# Patient Record
Sex: FEMALE | Race: WHITE | NOT HISPANIC OR LATINO | Employment: UNEMPLOYED | URBAN - METROPOLITAN AREA
[De-identification: names, ages, dates, MRNs, and addresses within clinical notes are randomized per-mention and may not be internally consistent; named-entity substitution may affect disease eponyms.]

---

## 2023-07-19 ENCOUNTER — OFFICE VISIT (OUTPATIENT)
Dept: PRIMARY CARE | Facility: CLINIC | Age: 53
End: 2023-07-19
Payer: COMMERCIAL

## 2023-07-19 VITALS
TEMPERATURE: 98 F | SYSTOLIC BLOOD PRESSURE: 108 MMHG | BODY MASS INDEX: 27.14 KG/M2 | HEART RATE: 77 BPM | HEIGHT: 64 IN | DIASTOLIC BLOOD PRESSURE: 70 MMHG | OXYGEN SATURATION: 97 % | WEIGHT: 159 LBS | RESPIRATION RATE: 18 BRPM

## 2023-07-19 DIAGNOSIS — G40.109 LOCALIZATION-RELATED FOCAL EPILEPSY WITH SIMPLE PARTIAL SEIZURES (MULTI): ICD-10-CM

## 2023-07-19 DIAGNOSIS — F41.1 GENERALIZED ANXIETY DISORDER: Primary | ICD-10-CM

## 2023-07-19 PROCEDURE — 99203 OFFICE O/P NEW LOW 30 MIN: CPT | Performed by: FAMILY MEDICINE

## 2023-07-19 PROCEDURE — 1036F TOBACCO NON-USER: CPT | Performed by: FAMILY MEDICINE

## 2023-07-19 RX ORDER — LAMOTRIGINE 150 MG/1
TABLET ORAL
COMMUNITY
Start: 2018-05-29

## 2023-07-19 RX ORDER — SERTRALINE HYDROCHLORIDE 50 MG/1
50 TABLET, FILM COATED ORAL DAILY
Qty: 30 TABLET | Refills: 5 | Status: SHIPPED | OUTPATIENT
Start: 2023-07-19 | End: 2024-01-12

## 2023-07-19 NOTE — ASSESSMENT & PLAN NOTE
Discussed diagnosis   Discussed different treatment options  Will check some basic labs to see if any secondary causes  Start treatment with Zoloft, Medication and potential side effects discussed at length.  Pt voices understanding.  Continue with counselor  Follow up in 3 months to reevaluate or earlier if any issues

## 2023-07-19 NOTE — PROGRESS NOTES
"Subjective   Patient ID: Bernadette Avalos is a 53 y.o. female who presents for Annual Exam (HAD A PANIC ATTACK DURING SB, SEEN PSYCH, NEURO AND WAS TOLD TO GO TO PCP, HAS EPILEPSY).    HPI   Has history of epilepsy since 2013 and was doing well.  This year on spring break started to have a lot of anxiety and panic attack, had a lot of physical symptoms.  Has had some similar symptoms in past (2021) but rare occurrences.     Feels that usually not an anxious person therefore not sure.  These symptoms have happened when she has been driving and especially out of town.  Does admit to being worried regarding her epilepsy and driving.  She is seeing a therapist currently as well.    Review of Systems  Negative unless noted in HPI    Objective   /70 (BP Location: Left arm)   Pulse 77   Temp 36.7 °C (98 °F) (Oral)   Resp 18   Ht 1.626 m (5' 4.02\")   Wt 72.1 kg (159 lb)   SpO2 97%   BMI 27.28 kg/m²     Physical Exam  Constitutional:       Appearance: She is normal weight.   Eyes:      Extraocular Movements: Extraocular movements intact.      Pupils: Pupils are equal, round, and reactive to light.   Cardiovascular:      Rate and Rhythm: Normal rate and regular rhythm.      Heart sounds: No murmur heard.     No gallop.   Pulmonary:      Effort: Pulmonary effort is normal.      Breath sounds: Normal breath sounds.   Musculoskeletal:      Cervical back: Normal range of motion.   Neurological:      General: No focal deficit present.      Mental Status: She is alert and oriented to person, place, and time.   Psychiatric:         Mood and Affect: Mood normal.         Thought Content: Thought content normal.         Judgment: Judgment normal.         Assessment/Plan   Problem List Items Addressed This Visit       Localization-related focal epilepsy with simple partial seizures (CMS/HCC)     Diagnosed in 2013, no obvious causes  Has been on medication and currently adjusting   Sees neurology at  regularly         " Relevant Orders    Lipid Panel    Basic Metabolic Panel    Generalized anxiety disorder - Primary     Discussed diagnosis   Discussed different treatment options  Will check some basic labs to see if any secondary causes  Start treatment with Zoloft, Medication and potential side effects discussed at length.  Pt voices understanding.  Continue with counselor  Follow up in 3 months to reevaluate or earlier if any issues         Relevant Medications    sertraline (Zoloft) 50 mg tablet    Other Relevant Orders    TSH with reflex to Free T4 if abnormal    CBC    Basic Metabolic Panel

## 2023-08-09 NOTE — ASSESSMENT & PLAN NOTE
Diagnosed in 2013, no obvious causes  Has been on medication and currently adjusting   Sees neurology at  regularly   Sotyktu Counseling:  I discussed the most common side effects of Sotyktu including: common cold, sore throat, sinus infections, cold sores, canker sores, folliculitis, and acne.? I also discussed more serious side effects of Sotyktu including but not limited to: serious allergic reactions; increased risk for infections such as TB; cancers such as lymphomas; rhabdomyolysis and elevated CPK; and elevated triglycerides and liver enzymes.?

## 2023-09-14 ENCOUNTER — NURSE TRIAGE (OUTPATIENT)
Dept: OTHER | Facility: CLINIC | Age: 53
End: 2023-09-14

## 2023-09-14 NOTE — TELEPHONE ENCOUNTER
Location of patient: [place]    Subjective: Caller states \"I missed the last step going down into the basement\"     Current Symptoms: Pain in right foot    Onset: 1 day ago; sudden    Associated Symptoms: irritability , fussiness    Pain Severity: 10/10; sharp; constant    What has been tried: Advil, Frozen peas    Recommended disposition: Go to ED/UCC Now (Or to Office with PCP Approval)    Care advice provided, patient verbalizes understanding; denies any other questions or concerns; instructed to call back for any new or worsening symptoms. Patient/caller agrees to proceed to nearest [facility]     This triage is a result of a call to [address] [place] [address]. Please do not respond to the triage nurse through this encounter. Any subsequent communication should be directly with the patient. Reason for Disposition   Followed a foot injury   Can't stand (bear weight) or walk    Protocols used:  Foot Pain-ADULT-AH, Ankle and Foot Injury-ADULT-AH

## 2023-10-05 PROBLEM — R92.8 ABNORMALITY OF RIGHT BREAST ON SCREENING MAMMOGRAPHY: Status: ACTIVE | Noted: 2023-10-05

## 2023-10-05 PROBLEM — R10.32 ABDOMINAL PAIN, LLQ (LEFT LOWER QUADRANT): Status: ACTIVE | Noted: 2023-10-05

## 2023-10-05 PROBLEM — S93.491A SPRAIN OF ANTERIOR TALOFIBULAR LIGAMENT OF RIGHT ANKLE: Status: ACTIVE | Noted: 2023-10-05

## 2023-10-05 PROBLEM — R53.83 FATIGUE: Status: ACTIVE | Noted: 2023-10-05

## 2023-10-05 PROBLEM — R55 SYNCOPE: Status: ACTIVE | Noted: 2023-10-05

## 2023-10-05 PROBLEM — S92.251A: Status: ACTIVE | Noted: 2023-10-05

## 2023-10-05 PROBLEM — M25.472 LEFT ANKLE SWELLING: Status: ACTIVE | Noted: 2023-10-05

## 2023-10-05 RX ORDER — LAMOTRIGINE 100 MG/1
200 TABLET ORAL 2 TIMES DAILY
COMMUNITY
Start: 2023-05-17

## 2023-10-05 RX ORDER — TRIAMCINOLONE ACETONIDE 1 MG/G
CREAM TOPICAL 2 TIMES DAILY
COMMUNITY
Start: 2019-08-15

## 2023-10-05 ASSESSMENT — ENCOUNTER SYMPTOMS
OCCASIONAL FEELINGS OF UNSTEADINESS: 0
LOSS OF SENSATION IN FEET: 0
DEPRESSION: 0

## 2023-10-05 NOTE — PROGRESS NOTES
"Physical Therapy Evaluation    Patient Name: Bernadette Avalos  MRN: [id number]  Today's Date: 10/6/2023  Visit: **/**  Referred by:      Diagnosis:   1. Sprain of anterior talofibular ligament of right ankle, subsequent encounter           SUBJECTIVE:53 y.o. English speaking female    Pt. Is 53 y.o. English speaking female who has a Hx of syncopal episodes.  She had one 9-14-23 and rolled R) ankle and sustained a lateral ankle sprain.  She was in a boot until this past week.  Now just uses it if ankle gets sore with prolonged walking.    Pain:    Pain 0-5/10 just anterior to lateral malleolus R) ankle.  Home Living:   Lives with  and children (HS) in 2 story home.  Prior level of function:   No limitations    OBJECTIVE:  ROM: DF- L) 15, R) 10 PF- L) 55, R) 50   INV- L) 55, R) 50   EV- L) 25, R) 20  End range ache with inversion  TTP over ATF  Circumference (just anterior to lateral malleolus L) 23 cm, R) 24  4/5 eversion others 5/5 R) ankle  SLSB bilaterally 4 sec.  Mild decrease in push-off of R) with gait, otherwise WNL.  Able to toe and heel walk    Outcome Measure:   FAAM: 55%    ASSESSMENT:  Pt. With lateral ankle sprain that requires PT to address mild ROM limits, strength and balance work to allow a return to full ADLs.    TREATMENT:  - Therex:   Standing calf S 30\" x 3  Standing PF S 10\"x10  Stand inversion to P1 3x10  Bilateral calf raises 3x10  Bilateral DR lifts 3x10  SLSB 10\" x 5     PATIENT EDUCATION:   HEP    PLAN:      Rehab potential: exvcellent  Plan of care agreement: Y    GOALS:  1) Pt. Will have improved pain free AROM to allow full ADLs  2) Pt. Will have improved LE strength to allow full ADLs  3) Pt. Will have improved SLSB on R) to at least 10 sec.  4) Pt. Will have improved FAAM score by at least 15% indicative of improved functional abilities.  5) Pt. Will be independent with HEP    Daily HEP.  F/U in 1-2 weeks and reassess and progress HEP towards PT goals.  Pt. To call if questions " arise.  Plan to see patient for 4-6 weeks.

## 2023-10-06 ENCOUNTER — EVALUATION (OUTPATIENT)
Dept: PHYSICAL THERAPY | Facility: CLINIC | Age: 53
End: 2023-10-06
Payer: COMMERCIAL

## 2023-10-06 DIAGNOSIS — S93.491D SPRAIN OF ANTERIOR TALOFIBULAR LIGAMENT OF RIGHT ANKLE, SUBSEQUENT ENCOUNTER: Primary | ICD-10-CM

## 2023-10-06 PROCEDURE — 97161 PT EVAL LOW COMPLEX 20 MIN: CPT | Mod: GP | Performed by: PHYSICAL THERAPIST

## 2023-10-06 PROCEDURE — 97110 THERAPEUTIC EXERCISES: CPT | Mod: GP | Performed by: PHYSICAL THERAPIST

## 2023-10-06 NOTE — LETTER
October 6, 2023     Patient: Bernadette Avalos   YOB: 1970   Date of Visit: 10/6/2023       To Whom it May Concern:    Bernadette Avalos was seen in my clinic on 10/6/2023. She {Return to school/sport:11273}.    If you have any questions or concerns, please don't hesitate to call.         Sincerely,          Boy Jefferson, PT        CC: No Recipients

## 2023-10-06 NOTE — LETTER
October 6, 2023     Patient: Bernadette Avalos   YOB: 1970   Date of Visit: 10/6/2023       To Whom It May Concern:    It is my medical opinion that Bernadette Avalos {Work release (duty restriction):83750}.    If you have any questions or concerns, please don't hesitate to call.         Sincerely,        Boy Jefferson, PT    CC: No Recipients

## 2023-10-13 ENCOUNTER — APPOINTMENT (OUTPATIENT)
Dept: PHYSICAL THERAPY | Facility: CLINIC | Age: 53
End: 2023-10-13
Payer: COMMERCIAL

## 2023-10-23 ENCOUNTER — LAB REQUISITION (OUTPATIENT)
Dept: LAB | Facility: HOSPITAL | Age: 53
End: 2023-10-23
Payer: COMMERCIAL

## 2023-10-23 DIAGNOSIS — N39.0 URINARY TRACT INFECTION, SITE NOT SPECIFIED: ICD-10-CM

## 2023-10-23 PROCEDURE — 87086 URINE CULTURE/COLONY COUNT: CPT

## 2023-10-23 PROCEDURE — 87186 SC STD MICRODIL/AGAR DIL: CPT

## 2023-10-24 ENCOUNTER — LAB REQUISITION (OUTPATIENT)
Dept: LAB | Facility: HOSPITAL | Age: 53
End: 2023-10-24
Payer: COMMERCIAL

## 2023-10-24 DIAGNOSIS — N39.0 URINARY TRACT INFECTION, SITE NOT SPECIFIED: ICD-10-CM

## 2023-10-25 ENCOUNTER — TREATMENT (OUTPATIENT)
Dept: PHYSICAL THERAPY | Facility: CLINIC | Age: 53
End: 2023-10-25
Payer: COMMERCIAL

## 2023-10-25 DIAGNOSIS — S93.491D SPRAIN OF ANTERIOR TALOFIBULAR LIGAMENT OF RIGHT ANKLE, SUBSEQUENT ENCOUNTER: Primary | ICD-10-CM

## 2023-10-25 PROCEDURE — 97110 THERAPEUTIC EXERCISES: CPT | Mod: GP | Performed by: PHYSICAL THERAPIST

## 2023-10-26 LAB — BACTERIA UR CULT: ABNORMAL

## 2023-11-02 ENCOUNTER — APPOINTMENT (OUTPATIENT)
Dept: PHYSICAL THERAPY | Facility: CLINIC | Age: 53
End: 2023-11-02
Payer: COMMERCIAL

## 2023-11-09 ENCOUNTER — TREATMENT (OUTPATIENT)
Dept: PHYSICAL THERAPY | Facility: CLINIC | Age: 53
End: 2023-11-09
Payer: COMMERCIAL

## 2023-11-09 DIAGNOSIS — S93.491D SPRAIN OF ANTERIOR TALOFIBULAR LIGAMENT OF RIGHT ANKLE, SUBSEQUENT ENCOUNTER: Primary | ICD-10-CM

## 2023-11-09 PROCEDURE — 97110 THERAPEUTIC EXERCISES: CPT | Mod: GP | Performed by: PHYSICAL THERAPIST

## 2023-11-09 NOTE — PROGRESS NOTES
"Physical Therapy Evaluation    Patient Name: Bernadette Avalos  MRN: [id number]  Today's Date: 11/9/2023  Visit: 3  Referred by:     Diagnosis:   1. Sprain of anterior talofibular ligament of right ankle, subsequent encounter           SUBJECTIVE:  53 y.o. female who returns for follow-up of R) lateral ankle sprain.  States she has mild infrequent pain now.  However, past couple days she has had numbness in the webspace of R) great and 2nd toes.      Pain:  Pain 0-5/10 just anterior to lateral malleolus R) ankle.    OBJECTIVE:  ROM: DF- L) 15, R) 15 PF- L) 55, R) 55   INV- L) 55, R) 55   EV- L) 25, R) 25  End range ache with inversion  Minimal TTP over ATF  Circumference (just anterior to lateral malleolus L) 23.5 cm, R) 24  5/5 R) ankle resisted movements with mild ache with  eversion.  SLSB bilaterally 4 sec.  Gait is without deviation today.    Outcome Measure:  FAAM: 55%    TREATMENT:  - Therex:  Recumbent bike L6 x 5 min.  Incline calf S 60\" x 2  4-pt PF S 10\"x10  Stand inversion to P1 2 way x 30 ea.  Eccentric R) calf raises 3x10  Bilateral DF lifts 3x15  Tband Eversion  green 3 x 10  Tband Inversion  green 3 x 10  Airex SLSB 30\" x 3  Sport cord walkouts 3 way x 10 ea.  L1  Fitter Rockits 2 way x 30 ea.  SL TG leg press 7+1 3x10    ASSESSMENT:  Pt. With lateral ankle sprain with full pain free ROM, Improving strength and function on the LE.  Tolerated today's progressions well.    PLAN:   Daily HEP.  F/U next week and reassess and continue to progress HEP towards PT goals.    GOALS:  1) Pt. Will have improved pain free AROM to allow full ADLs  2) Pt. Will have improved LE strength to allow full ADLs  3) Pt. Will have improved SLSB on R) to at least 10 sec.  4) Pt. Will have improved FAAM score by at least 15% indicative of improved functional abilities.  5) Pt. Will be independent with HEP                           "

## 2023-11-16 ENCOUNTER — APPOINTMENT (OUTPATIENT)
Dept: PHYSICAL THERAPY | Facility: CLINIC | Age: 53
End: 2023-11-16
Payer: COMMERCIAL

## 2023-11-29 ENCOUNTER — APPOINTMENT (OUTPATIENT)
Dept: PHYSICAL THERAPY | Facility: CLINIC | Age: 53
End: 2023-11-29
Payer: COMMERCIAL

## 2024-01-12 DIAGNOSIS — F41.1 GENERALIZED ANXIETY DISORDER: ICD-10-CM

## 2024-01-12 RX ORDER — SERTRALINE HYDROCHLORIDE 50 MG/1
50 TABLET, FILM COATED ORAL DAILY
Qty: 30 TABLET | Refills: 0 | Status: SHIPPED | OUTPATIENT
Start: 2024-01-12 | End: 2024-02-20

## 2024-02-19 DIAGNOSIS — F41.1 GENERALIZED ANXIETY DISORDER: ICD-10-CM

## 2024-02-20 RX ORDER — SERTRALINE HYDROCHLORIDE 50 MG/1
50 TABLET, FILM COATED ORAL DAILY
Qty: 30 TABLET | Refills: 0 | Status: SHIPPED | OUTPATIENT
Start: 2024-02-20 | End: 2024-03-21

## 2024-03-20 DIAGNOSIS — F41.1 GENERALIZED ANXIETY DISORDER: ICD-10-CM

## 2024-03-21 RX ORDER — SERTRALINE HYDROCHLORIDE 50 MG/1
50 TABLET, FILM COATED ORAL DAILY
Qty: 90 TABLET | Refills: 0 | Status: SHIPPED | OUTPATIENT
Start: 2024-03-21

## 2024-06-18 DIAGNOSIS — F41.1 GENERALIZED ANXIETY DISORDER: ICD-10-CM

## 2024-06-19 RX ORDER — SERTRALINE HYDROCHLORIDE 50 MG/1
50 TABLET, FILM COATED ORAL DAILY
Qty: 90 TABLET | Refills: 0 | Status: SHIPPED | OUTPATIENT
Start: 2024-06-19

## 2024-09-07 DIAGNOSIS — F41.1 GENERALIZED ANXIETY DISORDER: ICD-10-CM

## 2024-09-10 RX ORDER — SERTRALINE HYDROCHLORIDE 50 MG/1
50 TABLET, FILM COATED ORAL DAILY
Qty: 90 TABLET | Refills: 0 | Status: SHIPPED | OUTPATIENT
Start: 2024-09-10

## 2024-09-11 ENCOUNTER — HOSPITAL ENCOUNTER (OUTPATIENT)
Dept: RADIOLOGY | Facility: CLINIC | Age: 54
Discharge: HOME | End: 2024-09-11
Payer: COMMERCIAL

## 2024-09-11 VITALS — HEIGHT: 64 IN | BODY MASS INDEX: 27.33 KG/M2 | WEIGHT: 160.05 LBS

## 2024-09-11 DIAGNOSIS — Z12.31 SCREENING MAMMOGRAM FOR BREAST CANCER: ICD-10-CM

## 2024-09-11 PROCEDURE — 77067 SCR MAMMO BI INCL CAD: CPT

## 2024-09-16 DIAGNOSIS — Z00.00 HEALTH MAINTENANCE EXAMINATION: Primary | ICD-10-CM

## 2024-10-08 ASSESSMENT — PROMIS GLOBAL HEALTH SCALE
RATE_AVERAGE_FATIGUE: SEVERE
RATE_SOCIAL_SATISFACTION: EXCELLENT
EMOTIONAL_PROBLEMS: SOMETIMES
CARRYOUT_SOCIAL_ACTIVITIES: GOOD
RATE_MENTAL_HEALTH: GOOD
RATE_AVERAGE_PAIN: 4
RATE_PHYSICAL_HEALTH: FAIR
CARRYOUT_PHYSICAL_ACTIVITIES: MODERATELY
RATE_GENERAL_HEALTH: GOOD
RATE_QUALITY_OF_LIFE: EXCELLENT

## 2024-10-09 ENCOUNTER — LAB (OUTPATIENT)
Dept: LAB | Facility: LAB | Age: 54
End: 2024-10-09
Payer: COMMERCIAL

## 2024-10-09 DIAGNOSIS — Z00.00 HEALTH MAINTENANCE EXAMINATION: ICD-10-CM

## 2024-10-09 LAB
25(OH)D3 SERPL-MCNC: 16 NG/ML (ref 30–100)
ALBUMIN SERPL BCP-MCNC: 4.1 G/DL (ref 3.4–5)
ALP SERPL-CCNC: 73 U/L (ref 33–110)
ALT SERPL W P-5'-P-CCNC: 23 U/L (ref 7–45)
ANION GAP SERPL CALC-SCNC: 12 MMOL/L (ref 10–20)
APPEARANCE UR: CLEAR
AST SERPL W P-5'-P-CCNC: 24 U/L (ref 9–39)
BILIRUB SERPL-MCNC: 0.5 MG/DL (ref 0–1.2)
BILIRUB UR STRIP.AUTO-MCNC: NEGATIVE MG/DL
BUN SERPL-MCNC: 20 MG/DL (ref 6–23)
CALCIUM SERPL-MCNC: 8.8 MG/DL (ref 8.6–10.6)
CHLORIDE SERPL-SCNC: 105 MMOL/L (ref 98–107)
CHOLEST SERPL-MCNC: 148 MG/DL (ref 0–199)
CHOLESTEROL/HDL RATIO: 2.7
CO2 SERPL-SCNC: 29 MMOL/L (ref 21–32)
COLOR UR: YELLOW
CREAT SERPL-MCNC: 0.86 MG/DL (ref 0.5–1.05)
EGFRCR SERPLBLD CKD-EPI 2021: 81 ML/MIN/1.73M*2
ERYTHROCYTE [DISTWIDTH] IN BLOOD BY AUTOMATED COUNT: 12.5 % (ref 11.5–14.5)
GLUCOSE SERPL-MCNC: 92 MG/DL (ref 74–99)
GLUCOSE UR STRIP.AUTO-MCNC: NORMAL MG/DL
HCT VFR BLD AUTO: 42.4 % (ref 36–46)
HDLC SERPL-MCNC: 55.4 MG/DL
HGB BLD-MCNC: 13.7 G/DL (ref 12–16)
KETONES UR STRIP.AUTO-MCNC: NEGATIVE MG/DL
LDLC SERPL CALC-MCNC: 83 MG/DL
LEUKOCYTE ESTERASE UR QL STRIP.AUTO: NEGATIVE
MCH RBC QN AUTO: 29.7 PG (ref 26–34)
MCHC RBC AUTO-ENTMCNC: 32.3 G/DL (ref 32–36)
MCV RBC AUTO: 92 FL (ref 80–100)
MUCOUS THREADS #/AREA URNS AUTO: NORMAL /LPF
NITRITE UR QL STRIP.AUTO: NEGATIVE
NON HDL CHOLESTEROL: 93 MG/DL (ref 0–149)
NRBC BLD-RTO: 0 /100 WBCS (ref 0–0)
PH UR STRIP.AUTO: 6 [PH]
PLATELET # BLD AUTO: 229 X10*3/UL (ref 150–450)
POTASSIUM SERPL-SCNC: 4.6 MMOL/L (ref 3.5–5.3)
PROT SERPL-MCNC: 6.3 G/DL (ref 6.4–8.2)
PROT UR STRIP.AUTO-MCNC: NORMAL MG/DL
RBC # BLD AUTO: 4.62 X10*6/UL (ref 4–5.2)
RBC # UR STRIP.AUTO: NEGATIVE /UL
RBC #/AREA URNS AUTO: NORMAL /HPF
SODIUM SERPL-SCNC: 141 MMOL/L (ref 136–145)
SP GR UR STRIP.AUTO: 1.03
SQUAMOUS #/AREA URNS AUTO: NORMAL /HPF
TRIGL SERPL-MCNC: 46 MG/DL (ref 0–149)
TSH SERPL-ACNC: 1.76 MIU/L (ref 0.44–3.98)
UROBILINOGEN UR STRIP.AUTO-MCNC: NORMAL MG/DL
VLDL: 9 MG/DL (ref 0–40)
WBC # BLD AUTO: 5.8 X10*3/UL (ref 4.4–11.3)
WBC #/AREA URNS AUTO: NORMAL /HPF

## 2024-10-09 PROCEDURE — 84443 ASSAY THYROID STIM HORMONE: CPT

## 2024-10-09 PROCEDURE — 80053 COMPREHEN METABOLIC PANEL: CPT

## 2024-10-09 PROCEDURE — 82306 VITAMIN D 25 HYDROXY: CPT

## 2024-10-09 PROCEDURE — 81001 URINALYSIS AUTO W/SCOPE: CPT

## 2024-10-09 PROCEDURE — 80061 LIPID PANEL: CPT

## 2024-10-09 PROCEDURE — 85027 COMPLETE CBC AUTOMATED: CPT

## 2024-10-09 PROCEDURE — 36415 COLL VENOUS BLD VENIPUNCTURE: CPT

## 2024-10-10 ENCOUNTER — APPOINTMENT (OUTPATIENT)
Dept: PRIMARY CARE | Facility: CLINIC | Age: 54
End: 2024-10-10
Payer: COMMERCIAL

## 2024-10-10 VITALS
SYSTOLIC BLOOD PRESSURE: 118 MMHG | HEART RATE: 64 BPM | HEIGHT: 64 IN | DIASTOLIC BLOOD PRESSURE: 80 MMHG | BODY MASS INDEX: 29.78 KG/M2 | TEMPERATURE: 97.9 F | WEIGHT: 174.4 LBS | OXYGEN SATURATION: 97 %

## 2024-10-10 DIAGNOSIS — G40.109 LOCALIZATION-RELATED FOCAL EPILEPSY WITH SIMPLE PARTIAL SEIZURES (MULTI): ICD-10-CM

## 2024-10-10 DIAGNOSIS — Z23 NEED FOR INFLUENZA VACCINATION: ICD-10-CM

## 2024-10-10 DIAGNOSIS — E55.9 VITAMIN D DEFICIENCY: Primary | ICD-10-CM

## 2024-10-10 DIAGNOSIS — Z00.00 HEALTH MAINTENANCE EXAMINATION: Primary | ICD-10-CM

## 2024-10-10 PROBLEM — S92.253A CLOSED FRACTURE OF NAVICULAR BONE OF FOOT: Status: ACTIVE | Noted: 2023-10-05

## 2024-10-10 PROBLEM — M25.473 ANKLE SWELLING: Status: ACTIVE | Noted: 2023-10-05

## 2024-10-10 PROCEDURE — 90656 IIV3 VACC NO PRSV 0.5 ML IM: CPT | Performed by: FAMILY MEDICINE

## 2024-10-10 PROCEDURE — 90471 IMMUNIZATION ADMIN: CPT | Performed by: FAMILY MEDICINE

## 2024-10-10 PROCEDURE — 1036F TOBACCO NON-USER: CPT | Performed by: FAMILY MEDICINE

## 2024-10-10 PROCEDURE — 99396 PREV VISIT EST AGE 40-64: CPT | Performed by: FAMILY MEDICINE

## 2024-10-10 PROCEDURE — 3008F BODY MASS INDEX DOCD: CPT | Performed by: FAMILY MEDICINE

## 2024-10-10 RX ORDER — ERGOCALCIFEROL 1.25 MG/1
50000 CAPSULE ORAL
Qty: 8 CAPSULE | Refills: 0 | Status: SHIPPED | OUTPATIENT
Start: 2024-10-13 | End: 2024-12-08

## 2024-10-10 RX ORDER — MULTIVITAMIN/IRON/FOLIC ACID 18MG-0.4MG
1 TABLET ORAL DAILY
COMMUNITY

## 2024-10-10 ASSESSMENT — PATIENT HEALTH QUESTIONNAIRE - PHQ9
2. FEELING DOWN, DEPRESSED OR HOPELESS: NOT AT ALL
SUM OF ALL RESPONSES TO PHQ9 QUESTIONS 1 AND 2: 0
1. LITTLE INTEREST OR PLEASURE IN DOING THINGS: NOT AT ALL

## 2024-10-10 ASSESSMENT — ENCOUNTER SYMPTOMS
LOSS OF SENSATION IN FEET: 0
OCCASIONAL FEELINGS OF UNSTEADINESS: 0
DEPRESSION: 0

## 2024-10-10 NOTE — ASSESSMENT & PLAN NOTE
Recommended screening guidelines addressed and orders placed as indicated by age and chronic conditions  Screening labs reviewed  Flu shot today  Mammogram up to date  Continue to work on healthy lifestyle including well balanced diet, regular activity, limit alcohol, no tobacco products   Follow up annually

## 2024-10-10 NOTE — PROGRESS NOTES
Reason for Visit: Annual Physical Exam    HPI:  Weight gain: noticed recently, trying to make changes, wonders if related to menopause or medication    Zoloft has helped a lot with anxiety, would like to stay on medication    Active Problem List  Patient Active Problem List   Diagnosis    Localization-related focal epilepsy with simple partial seizures (Multi)    Generalized anxiety disorder    Syncope    Ankle swelling    Fatigue    Left lower quadrant abdominal pain    Abnormality of right breast on screening mammography    Closed fracture of navicular bone of foot    Sprain of talofibular ligament of right ankle    Health maintenance examination       Comprehensive Medical/Surgical/Social/Family History  Past Medical History:   Diagnosis Date    ADHD (attention deficit hyperactivity disorder)     Anxiety     Other conditions influencing health status     History Of ___ Previous Pregnancies    Personal history of other diseases of the respiratory system     Personal history of asthma    Seizures (Multi)      Past Surgical History:   Procedure Laterality Date     SECTION, CLASSIC  2013     Section     SECTION, LOW TRANSVERSE      HERNIA REPAIR  2013    Hernia Repair    MR HEAD ANGIO WO IV CONTRAST  2023    MR HEAD ANGIO WO IV CONTRAST 2023 [facility] MRI     Social History     Tobacco Use    Smoking status: Never    Smokeless tobacco: Never   Substance Use Topics    Alcohol use: Not Currently     Comment: Occasionally not weekly    Drug use: Never     Family History   Problem Relation Name Age of Onset    Epilepsy Father      Parkinsonism Father          symptomatic    Epilepsy Paternal Grandmother Lucia     Colon cancer Paternal Grandmother Lucia     Diabetes Other      Diabetes Other      ADD / ADHD Other      Epilepsy Other      Breast cancer Father's Sister Haylee     Miscarriages / Stillbirths Mother Lala     Diabetes Maternal Grandmother Darcie     Alcohol  "abuse Mother's Sister Silva     Drug abuse Mother's Sister Silva     Alcohol abuse Mother's Brother Guzman     Depression Brother Bertrand     Depression Sister Christine     Diabetes Mother's Brother Gurwinder     Drug abuse Sister Radha     Miscarriages / Stillbirths Sister Radha     Stroke Father's Sister Bernadette          Allergies and Medications  Patient has no known allergies.  Current Outpatient Medications on File Prior to Visit   Medication Sig Dispense Refill    b complex 0.4 mg tablet Take 1 tablet by mouth once daily.      BIOTIN ORAL Take by mouth.      lamoTRIgine (LaMICtal) 150 mg tablet Take by mouth.      sertraline (Zoloft) 50 mg tablet TAKE ONE TABLET BY MOUTH ONCE DAILY 90 tablet 0    [DISCONTINUED] lamoTRIgine (LaMICtal) 100 mg tablet Take 2 tablets (200 mg) by mouth twice a day.      [DISCONTINUED] triamcinolone (Kenalog) 0.1 % cream twice a day. THIN FILM TO AFFECTED AREAS. (AM AND PM).       No current facility-administered medications on file prior to visit.         ROS otherwise negative aside from what was mentioned above in HPI.    Vitals  /80 (BP Location: Left arm, Patient Position: Sitting, BP Cuff Size: Adult)   Pulse 64   Temp 36.6 °C (97.9 °F) (Oral)   Ht 1.626 m (5' 4\")   Wt 79.1 kg (174 lb 6.4 oz)   SpO2 97%   BMI 29.94 kg/m²   Body mass index is 29.94 kg/m².  Physical Exam  Gen: Alert, NAD  HEENT:  PERRL, EOMI, conjunctiva and sclera normal in appearance. External auditory canals/TMs normal; Oral cavity and posterior pharynx without lesions/exudate  Neck:  Supple with FROM; No masses/nodes palpable; Thyroid nontender and without nodules; No ANGELICA  Respiratory:  Lungs CTAB  Cardiovascular:  Heart RRR. No M/R/G. Peripheral pulses equal bilaterally  Abdomen:  Soft, nontender,  No R/G/R; No HSM or masses palpated  Extremities:  FROM all extremities; Muscle strength grossly normal with good tone  Neuro:  CN II-XII intact; Gross motor and sensory intact  Skin:  No suspicious lesions " present    Assessment and Plan:  Problem List Items Addressed This Visit       Localization-related focal epilepsy with simple partial seizures (Multi)    Current Assessment & Plan     Follows with neurology         Health maintenance examination - Primary    Current Assessment & Plan     Recommended screening guidelines addressed and orders placed as indicated by age and chronic conditions  Screening labs reviewed  Flu shot today  Mammogram up to date  Continue to work on healthy lifestyle including well balanced diet, regular activity, limit alcohol, no tobacco products   Follow up annually            Other Visit Diagnoses       Need for influenza vaccination        Relevant Orders    Flu vaccine, trivalent, preservative free, age 6 months and greater (Fluraix/Fluzone/Flulaval) (Completed)              Jael Ross MD

## 2024-12-10 DIAGNOSIS — F41.1 GENERALIZED ANXIETY DISORDER: ICD-10-CM

## 2024-12-10 RX ORDER — SERTRALINE HYDROCHLORIDE 50 MG/1
50 TABLET, FILM COATED ORAL DAILY
Qty: 90 TABLET | Refills: 0 | Status: SHIPPED | OUTPATIENT
Start: 2024-12-10

## 2025-03-11 DIAGNOSIS — F41.1 GENERALIZED ANXIETY DISORDER: ICD-10-CM

## 2025-03-11 RX ORDER — SERTRALINE HYDROCHLORIDE 50 MG/1
50 TABLET, FILM COATED ORAL DAILY
Qty: 90 TABLET | Refills: 0 | Status: SHIPPED | OUTPATIENT
Start: 2025-03-11

## 2025-05-16 ENCOUNTER — TELEMEDICINE (OUTPATIENT)
Dept: NEUROLOGY | Facility: HOSPITAL | Age: 55
End: 2025-05-16
Payer: COMMERCIAL

## 2025-05-16 DIAGNOSIS — G40.109 LOCALIZATION-RELATED FOCAL EPILEPSY WITH SIMPLE PARTIAL SEIZURES (MULTI): Primary | ICD-10-CM

## 2025-05-16 RX ORDER — LAMOTRIGINE 150 MG/1
150 TABLET ORAL 2 TIMES DAILY
Qty: 60 TABLET | Refills: 11 | Status: SHIPPED | OUTPATIENT
Start: 2025-05-16 | End: 2026-05-16

## 2025-05-16 NOTE — PROGRESS NOTES
[facility]   Epilepsy    Virtual or Telephone Consent    While technically available, the patient was unable or unwilling to consent to connect via audio/video telehealth technology; therefore, I performed this visit using a real-time audio only connection between Bernadette Molinalauraalexia & BRANDON Knox.  Verbal consent was requested and obtained from Bernadette Avalos on this date, 05/16/25 for a telehealth visit and the patient's location was confirmed at the time of the visit.      Patient ID: Bernadette Avalos 54 y.o.female presenting in follow-up for previously diagnosed epilepsy  Patient of: previously Dr. Ariza    HPI    -----------Classification-----------  Epileptic PAROXYSMAL Episodes   Semiology: generalized clonic seizure  1- Onset: Jan 2013  - Frequency: only once in January 2013  - Last event: Jan 2013  Etiology: unknown  Comorbidities: Panic attacks, previous vasovagal episodes     Epileptic PAROXYSMAL Episodes   Semiology: dialeptic seizure  1- Onset: Nov 2013  - Frequency: only once in Nov 2013  - Last event: Nov 2013  Etiology: unknown  Comorbidities: Panic attacks, previous vasovagal episodes     PAROXYSMAL Episodes   Semiology: somatosensory aura event (right face, arm and leg)  1- Onset: Dec 2020  - Frequency: once every 1-2 months  - Last event: last month  Etiology: unknown  Comorbidities: Panic attacks, previous vasovagal episodes when she was a child in the setting of blood drawn     Prior AEDs: keppra 500 mg BID (unknown if had any side effects with it or not)  Current AED: Lamictal 150 mg BID       RESULTS:  MRI: 8/2023- No acute infarct, recent hemorrhage, abnormal enhancement, or   intracranial mass effect. No MR apparent seizure focus identified.     EEG:  No EEG results found for the past 12 months    LABS:  LTG 2023 6.1      PRESENT CONCERNS:  Only 2 seizures in lifetime, 1 GTC and 1 dialeptic all in 2013. She remains on LTG 150mg bid with no seizure recurrence and no side  effects. Does have a family history of epilepsy. May like to try stopping the LTG in the future though can not stop driving at this time. Since last visit did start sertraline for panic attacks.       Review of Systems  All other systems reviewed and negative unless otherwise stated above    CONTROLLED SUBSTANCE  N/A    Vitals:  There were no vitals filed for this visit.    PHYSICAL EXAM:  This examination was performed over telehealth by telephone discussion--Patient is alert and oriented. Speech is fluid, without dysarthria. Able to appropriately give information about past history and current events. Further objective neurological testing not possible given nature of phone interview.        ASSESSMENT & PLAN:   54 y.o. female presenting in follow-up for previously diagnosed epilepsy    Problem List Items Addressed This Visit       Localization-related focal epilepsy with simple partial seizures (Multi) - Primary    Relevant Medications    lamoTRIgine (LaMICtal) 150 mg tablet    Other Relevant Orders    Lamotrigine     Bernadette is well controlled on LTG 150mg bid. No seizures since 2013. She would like to continue the medication. Will check serum level as last was 2023.     Continue -150  RTC 12 months   because you have not lost awareness you are not under restrictions at this time  Call me with any seizures prior to your next appointment   Given my contact information/instructions for [place]        The total appointment time today was  22 minutes. Time included preparing to see the patient, obtaining the history, performing a medically necessary appropriate focused physical examination, counseling and educating the patient/family/caregiver, ordering medications, tests and procedures, referring and communicating with other providers, independently interpreting results and communicating the results to the patient/family/caregiver, care coordination] and documenting clinical information in the medical  record.

## 2025-06-02 ENCOUNTER — OFFICE VISIT (OUTPATIENT)
Dept: URGENT CARE | Age: 55
End: 2025-06-02
Payer: COMMERCIAL

## 2025-06-02 VITALS
DIASTOLIC BLOOD PRESSURE: 77 MMHG | SYSTOLIC BLOOD PRESSURE: 112 MMHG | BODY MASS INDEX: 29.18 KG/M2 | RESPIRATION RATE: 16 BRPM | WEIGHT: 170 LBS | OXYGEN SATURATION: 95 % | TEMPERATURE: 97.3 F | HEART RATE: 77 BPM

## 2025-06-02 DIAGNOSIS — R30.0 DYSURIA: ICD-10-CM

## 2025-06-02 DIAGNOSIS — N30.01 ACUTE CYSTITIS WITH HEMATURIA: Primary | ICD-10-CM

## 2025-06-02 LAB
POC APPEARANCE, URINE: ABNORMAL
POC BILIRUBIN, URINE: NEGATIVE
POC BLOOD, URINE: ABNORMAL
POC COLOR, URINE: YELLOW
POC GLUCOSE, URINE: NEGATIVE MG/DL
POC KETONES, URINE: NEGATIVE MG/DL
POC LEUKOCYTES, URINE: ABNORMAL
POC NITRITE,URINE: NEGATIVE
POC PH, URINE: 6 PH
POC PROTEIN, URINE: NEGATIVE MG/DL
POC SPECIFIC GRAVITY, URINE: 1.02
POC UROBILINOGEN, URINE: 0.2 EU/DL
PREGNANCY TEST URINE, POC: NEGATIVE

## 2025-06-02 RX ORDER — SULFAMETHOXAZOLE AND TRIMETHOPRIM 800; 160 MG/1; MG/1
1 TABLET ORAL 2 TIMES DAILY
Qty: 14 TABLET | Refills: 0 | Status: SHIPPED | OUTPATIENT
Start: 2025-06-02

## 2025-06-02 ASSESSMENT — PATIENT HEALTH QUESTIONNAIRE - PHQ9
1. LITTLE INTEREST OR PLEASURE IN DOING THINGS: NOT AT ALL
2. FEELING DOWN, DEPRESSED OR HOPELESS: NOT AT ALL
SUM OF ALL RESPONSES TO PHQ9 QUESTIONS 1 AND 2: 0

## 2025-06-02 ASSESSMENT — PAIN SCALES - GENERAL: PAINLEVEL_OUTOF10: 6

## 2025-06-02 ASSESSMENT — ENCOUNTER SYMPTOMS
OCCASIONAL FEELINGS OF UNSTEADINESS: 0
FREQUENCY: 1
DEPRESSION: 0
DYSURIA: 1

## 2025-06-02 NOTE — PROGRESS NOTES
Subjective   Patient ID: Bernadette Avalos is a 54 y.o. female. They present today with a chief complaint of Difficulty Urinating.    History of Present Illness  HPI    54-year-old female presents with concerns of dysuria and increased urinary frequency. She states that these symptoms started a couple days ago; she has had a UTI in the past and states that her symptoms are similar. She denies any back pain, abdominal pain, nausea, vomiting, diarrhea, or constipation. She is here for evaluation.     Past Medical History  Allergies as of 06/02/2025    (No Known Allergies)       Prescriptions Prior to Admission[1]     Medical History[2]    Surgical History[3]     reports that she has never smoked. She has never used smokeless tobacco. She reports that she does not currently use alcohol. She reports that she does not use drugs.    Review of Systems  Review of Systems   Genitourinary:  Positive for dysuria and frequency.                                  Objective    Vitals:    06/02/25 1540   BP: 112/77   Pulse: 77   Resp: 16   Temp: 36.3 °C (97.3 °F)   SpO2: 95%   Weight: 77.1 kg (170 lb)     No LMP recorded. Patient is postmenopausal.    Physical Exam  Cardiovascular:      Rate and Rhythm: Normal rate and regular rhythm.      Pulses: Normal pulses.      Heart sounds: Normal heart sounds.   Pulmonary:      Effort: Pulmonary effort is normal.      Breath sounds: Normal breath sounds.   Abdominal:      General: There is no distension.      Tenderness: There is no abdominal tenderness. There is no guarding.         Procedures    Point of Care Test & Imaging Results from this visit  Results for orders placed or performed in visit on 06/02/25   POCT pregnancy, urine manually resulted   Result Value Ref Range    Preg Test, Ur Negative Negative   POCT UA Automated manually resulted   Result Value Ref Range    POC Color, Urine Yellow Straw, Yellow, Light-Yellow    POC Appearance, Urine Cloudy (A) Clear    POC Glucose, Urine  NEGATIVE NEGATIVE mg/dl    POC Bilirubin, Urine NEGATIVE NEGATIVE    POC Ketones, Urine NEGATIVE NEGATIVE mg/dl    POC Specific Gravity, Urine 1.020 1.005 - 1.035    POC Blood, Urine MODERATE (2+) (A) NEGATIVE    POC PH, Urine 6.0 No Reference Range Established PH    POC Protein, Urine NEGATIVE NEGATIVE mg/dl    POC Urobilinogen, Urine 0.2 0.2, 1.0 EU/DL    Poc Nitrite, Urine NEGATIVE NEGATIVE    POC Leukocytes, Urine LARGE (3+) (A) NEGATIVE      Imaging  No results found.    Cardiology, Vascular, and Other Imaging  No other imaging results found for the past 2 days      Diagnostic study results (if any) were reviewed by BRANDON Burciaga.    Assessment/Plan   Allergies, medications, history, and pertinent labs/EKGs/Imaging reviewed by BRANDON Burciaga.     Medical Decision Making  Patient was agreeable to oral antibiotic; education provided. I also explained that her urine will be sent out for a culture and she will be contacted if any changes need to be made to her treatment plan. As a result of the work-up, the patient was discharged home.  she was informed of her diagnosis and instructed to come back with any concerns or worsening of condition.  she and was agreeable to the plan as discussed above.  she was given the opportunity to ask questions.  All of the patient's questions were answered.    Orders and Diagnoses  Diagnoses and all orders for this visit:  Acute cystitis with hematuria  -     sulfamethoxazole-trimethoprim (Bactrim DS) 800-160 mg tablet; Take 1 tablet by mouth 2 times a day.  -     Urine Culture  Dysuria  -     POCT pregnancy, urine manually resulted  -     POCT UA Automated manually resulted      Medical Admin Record      Patient disposition: Home    Electronically signed by BRANDON Burciaga  4:01 PM           [1] (Not in a hospital admission)   [2]   Past Medical History:  Diagnosis Date    ADHD (attention deficit hyperactivity disorder)      Anxiety     Other conditions influencing health status     History Of ___ Previous Pregnancies    Personal history of other diseases of the respiratory system     Personal history of asthma    Seizures (Multi)    [3]   Past Surgical History:  Procedure Laterality Date     SECTION, CLASSIC  2013     Section     SECTION, LOW TRANSVERSE      HERNIA REPAIR  2013    Hernia Repair    MR HEAD ANGIO WO IV CONTRAST  2023    MR HEAD ANGIO WO IV CONTRAST 2023 [facility] MRI

## 2025-06-05 LAB — BACTERIA UR CULT: NORMAL

## 2025-06-26 ENCOUNTER — OFFICE VISIT (OUTPATIENT)
Dept: URGENT CARE | Age: 55
End: 2025-06-26
Payer: COMMERCIAL

## 2025-06-26 VITALS
BODY MASS INDEX: 29.18 KG/M2 | DIASTOLIC BLOOD PRESSURE: 79 MMHG | WEIGHT: 170 LBS | OXYGEN SATURATION: 95 % | RESPIRATION RATE: 14 BRPM | TEMPERATURE: 98.4 F | HEART RATE: 82 BPM | SYSTOLIC BLOOD PRESSURE: 118 MMHG

## 2025-06-26 DIAGNOSIS — J02.9 SORE THROAT: ICD-10-CM

## 2025-06-26 DIAGNOSIS — J06.9 VIRAL URI WITH COUGH: Primary | ICD-10-CM

## 2025-06-26 LAB
POC HUMAN RHINOVIRUS PCR: NEGATIVE
POC INFLUENZA A VIRUS PCR: NEGATIVE
POC INFLUENZA B VIRUS PCR: NEGATIVE
POC RESPIRATORY SYNCYTIAL VIRUS PCR: NEGATIVE
POC STREPTOCOCCUS PYOGENES (GROUP A STREP) PCR: NEGATIVE

## 2025-06-26 ASSESSMENT — PAIN SCALES - GENERAL: PAINLEVEL_OUTOF10: 0-NO PAIN

## 2025-06-26 ASSESSMENT — PATIENT HEALTH QUESTIONNAIRE - PHQ9
1. LITTLE INTEREST OR PLEASURE IN DOING THINGS: NOT AT ALL
SUM OF ALL RESPONSES TO PHQ9 QUESTIONS 1 AND 2: 0
2. FEELING DOWN, DEPRESSED OR HOPELESS: NOT AT ALL

## 2025-06-26 NOTE — PROGRESS NOTES
SUBJECTIVE:  Bernadetet Avalos is a 54 y.o. female who presents today for evaluation of symptoms of a URI. Symptoms include nasal congestion, runny nose, cough, watery eyes. Onset of symptoms was 3-4 days ago. Course has been stable since that time. Pt has tried an antihistamine and Advil cold and sinus to help relieve symptoms. Pt is drinking plenty of fluids with normal appetite. Denies fever, chills, severe SOB, rash, stiff neck, inability to swallow, GI sx, severe chest pain or extreme dizziness. Reports history of asthma as a child but well controlled in adulthood and doesn't need inhalers.    REVIEW OF SYSTEMS:  Pertinent items are noted in HPI.    PAST MEDICAL HISTORY:  Medical History[1]      ALLERGIES:  Allergies[2]      OBJECTIVE:  Vitals:    06/26/25 1429   BP: 118/79   Pulse: 82   Resp: 14   Temp: 36.9 °C (98.4 °F)   TempSrc: Oral   SpO2: 95%   Weight: 77.1 kg (170 lb)         General Appearance: Alert, cooperative, no distress, appropriate for age  Head: Normocephalic, without obvious abnormality  Eyes: PERRL, EOM's intact, conjunctiva and cornea clear  Ears: TM pearly gray, no perforation, no effusion, external ear canals normal  Nose: + clear discharge; nasal mucosa edematous, no sinus tenderness  Throat: Mucosa are moist, pink; Posterior pharynx with mild erythema  Neck: Supple; symmetrical, trachea midline, no adenopathy  Heart: Regular rate and rhythm.  Lungs: CTAB. No wheezing, rales, or rhonchi. Respirations unlabored.  Skin/Hair/Nails: Skin warm, dry and intact  Neurologic: Alert and oriented x3, no cranial nerve deficits, gait steady      Results for orders placed or performed in visit on 06/26/25   POCT SPOTFIRE R/ST Panel Mini w/Strep A ([facility]) manually resulted    Collection Time: 06/26/25  2:50 PM   Result Value Ref Range    POC Group A Strep, PCR Negative Negative    POC Respiratory Syncytial Virus PCR Negative Negative    POC Influenza A Virus PCR Negative Negative    POC Influenza B  Virus PCR Negative Negative    POC Human Rhinovirus PCR Negative Negative           ASSESSMENT:  1. Viral URI with cough        2. Sore throat  POCT SPOTFIRE R/ST Panel Mini w/Strep A ([facility]) manually resulted              PLAN:  Discussed diagnosis and treatment of URI/viral illness vs allergies   Suggested symptomatic OTC remedies  Flonase, Claritin or Zyrtec   Nasal saline spray for congestion  Follow up as needed      Jael Turner PA-C      I have given the patient instructions regarding her diagnosis, expectations, follow up, and return to the ER precautions. I explained to the patient that emergent conditions may arise to return to the immediate care or go to the ER for new, worsening or any persistent conditions. I've explained the importance of following up with her doctor- Jael Ross MD - as instructed. The patient verbalized understanding of the discharge instructions and plan.           [1]   Past Medical History:  Diagnosis Date    ADHD (attention deficit hyperactivity disorder)     Anxiety     Other conditions influencing health status     History Of ___ Previous Pregnancies    Personal history of other diseases of the respiratory system     Personal history of asthma    Seizures (Multi)    [2] No Known Allergies

## 2025-06-30 DIAGNOSIS — F41.1 GENERALIZED ANXIETY DISORDER: ICD-10-CM

## 2025-07-01 RX ORDER — SERTRALINE HYDROCHLORIDE 50 MG/1
50 TABLET, FILM COATED ORAL DAILY
Qty: 90 TABLET | Refills: 0 | Status: SHIPPED | OUTPATIENT
Start: 2025-07-01